# Patient Record
Sex: FEMALE | Race: BLACK OR AFRICAN AMERICAN
[De-identification: names, ages, dates, MRNs, and addresses within clinical notes are randomized per-mention and may not be internally consistent; named-entity substitution may affect disease eponyms.]

---

## 2018-09-15 ENCOUNTER — HOSPITAL ENCOUNTER (EMERGENCY)
Dept: HOSPITAL 92 - ERS | Age: 28
LOS: 1 days | Discharge: HOME | End: 2018-09-16
Payer: SELF-PAY

## 2018-09-15 DIAGNOSIS — O20.0: Primary | ICD-10-CM

## 2018-09-15 LAB
BASOPHILS # BLD AUTO: 0 THOU/UL (ref 0–0.2)
BASOPHILS NFR BLD AUTO: 0.3 % (ref 0–1)
CRYSTAL-AUWI FLAG: 0.4 (ref 0–15)
EOSINOPHIL # BLD AUTO: 0.1 THOU/UL (ref 0–0.7)
EOSINOPHIL NFR BLD AUTO: 1.5 % (ref 0–10)
HEV IGM SER QL: 4.5 (ref 0–7.99)
HGB BLD-MCNC: 12.5 G/DL (ref 12–16)
HYALINE CASTS #/AREA URNS LPF: (no result) LPF
LYMPHOCYTES # BLD: 3.4 THOU/UL (ref 1.2–3.4)
LYMPHOCYTES NFR BLD AUTO: 34.1 % (ref 21–51)
MCH RBC QN AUTO: 25.7 PG (ref 27–31)
MCV RBC AUTO: 77.1 FL (ref 78–98)
MONOCYTES # BLD AUTO: 0.7 THOU/UL (ref 0.11–0.59)
MONOCYTES NFR BLD AUTO: 7.4 % (ref 0–10)
NEUTROPHILS # BLD AUTO: 5.7 THOU/UL (ref 1.4–6.5)
NEUTROPHILS NFR BLD AUTO: 56.8 % (ref 42–75)
PATHC CAST-AUWI FLAG: 0.14 (ref 0–2.49)
PLATELET # BLD AUTO: 239 THOU/UL (ref 130–400)
RBC # BLD AUTO: 4.84 MILL/UL (ref 4.2–5.4)
RBC UR QL AUTO: (no result) HPF (ref 0–3)
SP GR UR STRIP: 1.02 (ref 1–1.04)
SPERM-AUWI FLAG: 0 (ref 0–9.9)
WBC # BLD AUTO: 10 THOU/UL (ref 4.8–10.8)
WBC UR QL AUTO: (no result) HPF (ref 0–3)
YEAST-AUWI FLAG: 0 (ref 0–25)

## 2018-09-15 PROCEDURE — 84702 CHORIONIC GONADOTROPIN TEST: CPT

## 2018-09-15 PROCEDURE — 86850 RBC ANTIBODY SCREEN: CPT

## 2018-09-15 PROCEDURE — 87591 N.GONORRHOEAE DNA AMP PROB: CPT

## 2018-09-15 PROCEDURE — 81003 URINALYSIS AUTO W/O SCOPE: CPT

## 2018-09-15 PROCEDURE — 87491 CHLMYD TRACH DNA AMP PROBE: CPT

## 2018-09-15 PROCEDURE — 76856 US EXAM PELVIC COMPLETE: CPT

## 2018-09-15 PROCEDURE — 86901 BLOOD TYPING SEROLOGIC RH(D): CPT

## 2018-09-15 PROCEDURE — 36415 COLL VENOUS BLD VENIPUNCTURE: CPT

## 2018-09-15 PROCEDURE — 81015 MICROSCOPIC EXAM OF URINE: CPT

## 2018-09-15 PROCEDURE — 87480 CANDIDA DNA DIR PROBE: CPT

## 2018-09-15 PROCEDURE — 85025 COMPLETE CBC W/AUTO DIFF WBC: CPT

## 2018-09-15 PROCEDURE — 87660 TRICHOMONAS VAGIN DIR PROBE: CPT

## 2018-09-15 PROCEDURE — 87510 GARDNER VAG DNA DIR PROBE: CPT

## 2018-09-15 PROCEDURE — 86900 BLOOD TYPING SEROLOGIC ABO: CPT

## 2018-09-16 NOTE — ULT
ULTRASOUND PELVIC TRANSVAGINAL

9/15/18

 

HISTORY: 

Evaluate for ectopic pregnancy. 

 

COMPARISON:  

None.

 

TECHNIQUE:  

Real time gray scale, color doppler and spectral analysis of the pelvis was performed transabdominal 
approach. 

 

FINDINGS:  

There is a single viable intrauterine pregnancy with average ultrasound age of 11 week, 5 days. Estim
ated date of delivery 4/1/19. Crown-rump length 4.92 cm, 11 week, 5 day. Fetal heart rate documented 
at 158 beats per minute. 

 

Small subchorionic hemorrhage. Small left ovarian cyst. Right ovary is unremarkable. 

 

IMPRESSION:  

Single viable intrauterine pregnancy.  

 

 

POS: TRISTIAN

## 2018-12-03 ENCOUNTER — HOSPITAL ENCOUNTER (OUTPATIENT)
Dept: HOSPITAL 92 - BICULT | Age: 28
Discharge: HOME | End: 2018-12-03
Attending: FAMILY MEDICINE
Payer: COMMERCIAL

## 2018-12-03 DIAGNOSIS — Z3A.23: ICD-10-CM

## 2018-12-03 DIAGNOSIS — R10.2: Primary | ICD-10-CM

## 2018-12-03 PROCEDURE — 76805 OB US >/= 14 WKS SNGL FETUS: CPT

## 2018-12-03 NOTE — ULT
ULTRASOUND OBSTETRICAL COMPLETE:

 

Date:  12/03/18 

 

HISTORY:

27-year-old female in second trimester of pregnancy with lower abdominal/pelvic pain. Evaluate fetal 
anatomy. 

 

FINDINGS:

 

Fetal number: Carrasquillo. 

Fetal lie: Cephalic. 

Maternal cervix: Closed. Not measured. Internal cervical os not visualized. 

Placenta: Posterior. Inferior edge not well visualized. 

Amniotic fluid volume: FUENTES = 16 cm. 

Fetal heart rate: 142 bpm. 

 

The following fetal anatomy is visualized, with no evidence of anomalies:

 

Head, lateral ventricles, cerebellum, nose and lips, spine, upper limbs, lower limbs, four chamber he
art, umbilical cord, cord insertion, stomach, kidneys, and bladder.

 

Fetal biometry:

 

Head circumference (HC):       20.2 cm      22w      3d

Biparietal diameter (BPD):      5.2 cm      21w      5d

Abdominal circumference (AC):      17.7 cm      22w      4d

Femur length (FL):            3.7 cm      21w      6d

 

Average ultrasound age (AUA):                  2w      1d

Estimated date of delivery (CHRISTOPHER):  04/07/2019

 

Last menstrual period (LMP):  06/25/2018. 

Gestational age by LMP: 23w 0d

 

Estimated fetal weight (EFW):  483 g +/- 71 g (1 lb 1 oz +/- 2 oz)

 

IMPRESSION:

1. Live second trimester intrauterine gestation.

2. Estimated gestational age of 23 weeks, 1 day.

3. Cephalic lie.

4. No fetal anatomical abnormalities.

5.  Poor visualization of internal cervical os and inferior edge of placenta. Recommend follow-up. 

 

renzo []

 

POS: TERRY

## 2019-02-18 NOTE — PDOC.FPROB
FMR OB H&P: HPI





- History of Present Illness


Chief Complaint: Abdominal pain and vaginal pressure


Indentification: This is a 27 yo  at 34.0 wk by LMP c/w 11.5wk US


History of Present Illness: 





This is a 27 yo  at 34.0 wk by LMP c/w 11.5wk US who present to L&D with 

a cc of abdominal pain and vaginal pressure. She states this started at around 

noon today. She reports pelvic pressure, 4 contractions since that time, and 

nausea. She reports some blurring of her vision in the last 3 weeks but 

currently denies it. She believes the blurring is 2/2 to recently starting and 

stopping her zoloft. She denies LOF, vaginal bleeding, headache, or SOB. She 

reports FM.


Primary Care Physician: 





Escobar Victoria MD 





FMR OB H&P: Current Pregnancy





- Prenatal Care


: 4


Para: 1203


Gestational age: 34.0


Due date: 19


Dating Criteria: LMP/11.5wk US





- OB Labs


Blood type: B


RH: positive


Antibody Screen: negative


HIV: negative


RPR: negative


HepBsAg: negative


Rubella: immune


Pap Smear: Negative for intraepithelial lesion


1 hour gtt: 124


A1c: 5.0


GBS: unknown





- First Trimester Ultrasound


First trimester: 





11.5 wk dating ultrasound done in the ED on 9/15/18 placing CHRISTOPHER at 19





FMR OB H&P: History





- Past Medical History


PMH: 





Major depression, maternal obesity





- OB History


OB History: 





1st pregnancy,  5 months premature, 1lb 5oz, alive and well now


2nd  Term


3rd  37wk





- GYN History


GYN History: 





none





- Surgical History


Sx History: 





Noncontributory





- Social History


Social History: 





none





- Family History


Family History: 





none





FMR OB H&P: Medications





- Current


Home Medications: 


 











 Medication  Instructions  Recorded  Confirmed  Type


 


Prenatal 21/Iron Fu/Folic Acid 1 tablet PO DAILY 13 History





[Prenatal Complete Caplet]    


 


Sertraline HCl [Zoloft] 25 mg PO DAILY 19 History











Allergies/Adverse Reactions: 


 Allergies











Allergy/AdvReac Type Severity Reaction Status Date / Time


 


No Known Allergies Allergy   Verified 19 15:26














FMR OB H&P: ROS





- Review of Systems


General: denies: fever/chills, weight/appetite/sleep changes, fatigue


Eyes: reports: vision changes.  denies: eye pain


Cardiovascular: denies: chest pain, palpitation, edema


Respiratory: denies: cough, congestion, shortness of breath


Gastrointestinal: reports: abdominal pain, nausea.  denies: vomiting, diarrhea, 

constipation


Genitourinary (Female): reports: dysuria (in the past), contractions, vaginal 

pressure.  denies: polyuria, vaginal discharge, vaginal pain, vaginal bleeding


Musculoskeletal: reports: pain (Hip pain)


Neurologic: denies: numbness, syncope, loss of counsciousness, headache


Integumentary: denies: itching, rash


Endocrine: denies: polydipsia, polyuria, polyphagia


Psychological: reports: depression.  denies: anxiety





FMR OB H&P: Vital Signs





- Maternal


Vital signs: 





Temp 98.8, /56, HR 77, RR 18





- Fetal Heart Tones


Baseline: 135 (Poor tracing due to body habitus)


[place] contractions every: None visualized, 1 felt by nursing during visit





FMR OB H&P: Physical Exam





- Physical Exam


General: NAD, awake, alert and oriented


HEENT: EOMI, MMM


Neck: FROM, no JVD


Chest: non-tender to palpation, no lesions


Heart: RRR, normal S1/S2, no murmurs/rubs/gallops, pulses present, no edema


General: CTAB, no respiratory distress, no wheezing, no retractions


Abdomen: soft, bowel sound present, other (mild lower abdominal/pelvic pain 

with palpation of abdomen)


Musculoskeletal: pulses present, FROM in all four extremities


Neurological: sensation to pain,touch and proprioception grossly normal


Skin: capillary refill <2 seconds


Lymphatic: no unusual bruising or bleeding, no purpura


Psychiatric: intact recent and remote memory, good judgement and insight





FMR OB H&P: A/P





- Problem List


(1) Third trimester pregnancy


Status: Acute   Code(s): Z34.93 - ENCNTR FOR SUPRVSN OF NORMAL PREG, UNSP, 

THIRD TRIMESTER   


Disposition: 





This is a 27 yo  at 34.0 wk by LMP c/w 11.5wk US








Abdominal pain, R/O  labor


-Monitor FHTs


-UA with Urine culture


-VP3 and GC chlamydia collected and pending


-Speculum exam shows small, thick, white discharge, cervix appears closed


-SVE shows 1/T/H


-Pt has an appointment with Dr. Victoria tomorrow


-Pain is likely 2/2 david shoemaker contractions, encourage hydration and to 

follow up tomorrow


-Will call pt with lab results and send in appropriate medications  


Discussion: 


Date/Time: 19 7112











This H&P was discussed with  [] and  [] who agree with the above 

documentation and plan.





Signature: 





Dirk Lazaro, PGY-1





Addendum - Attending





- Attending Attestation


Date/Time: 19 9304





I personally evaluated the patient and discussed the management with Dr. Lazaro


I agree with the History, Examination, Assessment and Plan documented above 

with any addition or exceptions noted below.


27 yo  at 34 by LMP/11.5w US presenting for abdominal pain, vaginal 

pressure and 4 contractions. 


No evidence of  labor. Cervix closed. Labs negative. 


Counseling provided. Strict ER precautions reviewed. Followup with Dr Victoria.

## 2019-04-02 NOTE — PDOC.EVN
Event Note





- Event Note


Event Note: 


Subjective:


This is a 27yo  who delivered a viable M via pLTCS @ 0302 on 19 due 

to NRFHT. APGARS 8,9. QBL 2385ml. Patient doing well 6hrs after section. 

Patient is complaining of 10/10 pain at the incision site. Patient has wound 

vac in place. Patient currently using PCA pump and has clicked the button 3 

times in the last hour. Patient states she has not ambulated, not passed flatus 

and has not eaten. Patient complaining of some SOB.





Objective:


PE:


General: patient resting comfortably in bed, no distress


Cardio: RRR, no murmurs, rubs or gallops


Resp: CTAB, no labored breathing


Abd: wound vac in place - clean, dry, intact, mild TTP along incision, guarding


MSK: no LE swelling





A&P:


- Continue PP care


- Continue PCA pump- advised patient to use every 10 min as instructed by 

anesthesia. Will reassess later to see if pain is controlled. 


- Encourage frequent ambulation


- Monitor VS, currently stable


- AM hemogram pending





Case discussed with Dr. Grover 








Attending Note: 


27 yo  female s/p Emergent PLTCS for fetal bradycardia 


HD#1


POD#0


Patient with increased post-op pain. Anaesthesia managing via PCA. Tolerating 

liquids. 





VS reviewed. Labs reviewed. Op-report reviewed. 


Mild distress. In pain. 


RRR. No murmurs. 


CTA bilaterally. No W/C/R


Fundus firm. Nontender. 


Wound vac in place. Tender to palpation to abdomen surrounding incision. 


No E/C/C





1. s/p PLTCS: Continue routine post-op care. Monitor VS and urine output 

closely. Continue to monitor wound vac for output. Montior post-op pain 

closely. 


2. Severe PPH due to uterine extension: Repeat H&H along with coags now. Trend. 

Denies dizziness at present. HR WNL. BP WNL. Blood and products as needed. 

Continue IVFs for now. 


3. Hx of PTL: Recommend 17-OHP with future pregnancies. Did not complete course 

this pregnancy. 


4. Hx of MDD: Continue SSRI


5. BMI 45: Lifestyle changes. Encourage breast feeding. 


6. Contraception: Unsure. Considering LARC. 





Flori

## 2019-04-02 NOTE — DN
DATE OF PROCEDURE:  2019



RESIDENT SURGEON:  Edenilson Moreno MD, PGY-2.



ATTENDING SURGEON: Dr. Lety Candelaria 



ASSISTANT SURGEON:  Dr. Alex Abdalla.



PROCEDURE PERFORMED:  Primary low-transverse  section.



PREOPERATIVE DIAGNOSES:  

1. Term intrauterine pregnancy.

2. Non-reassuring fetal heart tones.

3. Depression.

4. History of  labors.



POSTOPERATIVE DIAGNOSES:  

1. Term intrauterine pregnancy.

2. Non-reassuring fetal heart tones.

3. Depression.

4. History of  labors.

5. Postpartum hemorrhage.



ANESTHESIA:  General anesthesia.



INDICATIONS:  This patient is a 28-year-old, G4, P1-2-0-3 female at 40 and 1 
week's

gestation, who presented for induction of labor when she was begun with 
induction of

labor with Cytotec, but the infant then had non-reassuring fetal heart tones 
had a

heart rate dropped down into the 70s off and on, and at that time, we took her 
for

primary . 



PROCEDURE IN DETAIL:  After risks, benefits, and alternatives were explained to 
the

patient, she gave an informed consent.  Preoperative antibiotics included 
cefazolin

2 g IV.  The patient was taken to the operating room where general anesthesia 
was

initiated, as fetal heart tones were in the 70s when we checked them at the 
bedside.

 She was placed in supine position with left tilt, and prepped and draped in the

usual sterile fashion.  We did not have time to allow for the deep prep to 
fully dry

as it was an emergent .  A Pfannenstiel incision was made with a 
scalpel

and carried down to the level of fascia, which was sharply nicked.  Fascia was 
cut

and extended bilaterally with curved Olivas scissors.  The inferior and superior 
edges

of the cut fascial edges were elevated with Kocher clamps and the rectus muscles

were sharply and bluntly dissected free.  Recti were divided digitally, 
retracted

manually.  Peritoneum was entered bluntly and retracted manually.  Bladder 
blade was

placed.  A low transverse score was made with a scalpel and the uterus was 
entered

in the midline with a scalpel.  Clear fluid was seen and the hysterotomy was

extended manually.  Infant was noted to be vertex and was easily delivered by 
fundal

pressure.  There was a nuchal cord x1 and body cord x1 noted.  Mouth and nares 
were

bulb suctioned.  Cord clamped and cut and grossly normal male infant was handed 
to

waiting nurse.  Cord gas was obtained and then cord blood was obtained.  
Placenta

was manually extracted, found to be intact with 3-vessel cord and was sent for

pathology.  The uterus was then able to be externalized.  Due to its large size
, we

were unable to remove it and so we had to perform repair within the abdomen.  
The

endometrium was curetted with a dry lap.  During this time, it was noted that 
there

was an extension of the hysterotomy incision causing a little bit more excessive

bleeding.  Bladder blade was replaced and the uterus was closed with a running

locking 1 Monocryl suture.  During this time with the extension of the uterus, 
there

was still noted to be some bleeding.  There was concern that we had extended 
into

the artery.  At this time, we consulted Dr. Alex Abdalla, OB-GYN to assist, he 
came

in and used 1-0 chromic suture in  a horizontal mattress fashion.  We also then

used a FloSeal on the lower uterine segment as there was some bleeding still,

FloSeal was applied.  Following this, hemostasis was noted.  The abdomen was

irrigated with saline and suctioned free of clots.  The hysterotomy was again 
noted

to be hemostatic.  The peritoneum was closed with 3-0 chromic suture in a 
running

nonlocking fashion.  The fascia was then closed with a running nonlocking 0 
Vicryl

suture.  This subcutaneous tissue was irrigated and bleeders were made 
hemostatic with

Bovie cautery.  The subcutaneous layer was then approximated with 3-0 plain gut 
suture with

three interrupted sutures.  The skin was approximated with staples and a wound 
VAC

was placed.  All counts were correct.  The patient tolerated the procedure well 
and

was taken to recovery room in stable condition.  



QBL was noted to be 2385 mL. 



COMPLICATIONS:   postpartum hemorrhage and extension of the hysterotomy toward

her left side. 



FINDINGS:  Grossly normal male infant with Apgars of 9 and 9, delivered at 0302

hours, grossly normal placenta with three-vessel cord was sent to pathology. 



DRAINS:  Dillon to gravity draining clear urine.







Job ID:  [id number]



MTDD

## 2019-04-02 NOTE — PDOC.EVN
Event Note





- Event Note


Event Note: 


I was present and assisted with the emergent 1* LCT C/S for repetitive 

prolonged decelerations under general anesthesia. Viable male infant was 

delivered in vertex presentation. Nuchal and body cord noted and reduced. 

Apgars 9/9. Cord gas and cord blood collected. Uterus closed in running locked 

fashion with good hemostasis. Left angle of incision had area of significant 

bleeding prior to closure with improvement and area was oversewn with good 

hemostasis. Rest of closure if abdomen with no complications. QBL 2385 mL. 

Infant to nursery and mother to recovery in good condition. Resident- Tiffanie.

## 2019-04-02 NOTE — PDOC.FPROB
FMR OB H&P: HPI





- History of Present Illness


Chief Complaint: induction of labor


Indentification: 


History of Present Illness: 





This is a 27yo  at 40w EGA by 1TUS presenting for induction of labor. Hx 

is significant for  labor in previous pregnacies at a reported "20 weeks

" and 35weeks EGA. Pt has been non compliant with [place] injections and non 

compliant with prenatal vitamin. Pt has also been referred to [facility] for obesity 

but has been non compliant with visits. Otherwise prenatal care has been 

unremarkable with normal labwork thus far. 





Today pt denies vaginal bleeding/LOF/contractions. She reports good fetal mvmt.


Primary Care Physician: 





Dr. Escobar Gonsales





FMR OB H&P: Current Pregnancy





- Prenatal Care


: 4


Para: 1203


Gestational age: 40


Due date: 2019


Total weight gain: 21lbs





- OB Labs


Blood type: B


RH: negative


Antibody Screen: negative


HIV: negative


RPR: negative


HepBsAg: negative


Rubella: immune


Urine drug screen: negative


Gonorrhea: negative


Chlamydia: negative


1 hour gtt: 124


GBS: negative





FMR OB H&P: History





- Past Medical History


PMH: 





depression





- Surgical History


Sx History: 





none





- Social History


Social History: 





denies t/a/d





- Family History


Family History: 





none





FMR OB H&P: Medications





- Current


Home Medications: 


 











 Medication  Instructions  Recorded  Confirmed  Type


 


Sertraline HCl [Zoloft] 50 mg PO DAILY 19 History











Allergies/Adverse Reactions: 


 Allergies











Allergy/AdvReac Type Severity Reaction Status Date / Time


 


No Known Allergies Allergy   Verified 19 23:42














FMR OB H&P: ROS





- Review of Systems


General: denies: fever/chills, fatigue


Eyes: denies: eye pain, vision changes


ENT: denies: nasal congestion, rhinorrhea


Cardiovascular: denies: chest pain, palpitation


Respiratory: denies: cough, congestion, shortness of breath


Gastrointestinal: denies: abdominal pain, indigestion


Genitourinary (Female): denies: incontinence, dysuria


Musculoskeletal: denies: pain, stiffness


Neurologic: denies: numbness, syncope, seizures


Integumentary: denies: itching, rash


Endocrine: denies: cold intolerance, heat intolerance


Hematologic/Lymphatic: denies: prolonged or excessive bleeding


Psychological: reports: depression.  denies: anxiety





FMR OB H&P: Physical Exam





- Physical Exam


General: NAD, awake, alert and oriented


HEENT: EOMI, MMM, grossly normal vision, grossly normal hearing


Neck: supple, trachea midline


Chest: non-tender to palpation


Breast: symmetric


Heart: RRR, normal S1/S2


General: CTAB, no respiratory distress


Abdomen: soft, gravid


Musculoskeletal: normal gait and station, pulses present


Neurological: no tremor, no focal deficit


Skin: no rash, good tugor


Lymphatic: no unusual bruising or bleeding, no purpura


Psychiatric: intact recent and remote memory, good judgement and insight





- Pelvic Exam


SVE: /-3


Farah score: 3





FMR OB H&P: A/P





- Problem List


(1) Third trimester pregnancy


Current Visit: No   Status: Acute   Code(s): Z34.93 - ENCNTR FOR SUPRVSN OF 

NORMAL PREG, UNSP, THIRD TRIMESTER   


Discussion: 


Term IUP


A- FHT stable with good accells, no contractions on subjective or on monitor. 

Cervix unfavorable as of now. Pt has refused tdap vaccine


P- will start induction with cytotec.


- will advise pt for tdap vaccine during hospitalization


- monitor fetus on monitor


- recheck pt in 4 hours


- pt desires epidural and will now watch video








Addendum - Attending





- Attending Attestation


Date/Time: 19 0050





I personally evaluated the patient and discussed the management with Dr. Lr


I agree with the History, Examination, Assessment and Plan documented above 

with any addition or exceptions noted below - 29 yo @40 weeks admitted 

for elective induction. Current pregnancy complicated by obesity. POB Hx 

includes  delivery at 25 weeks and 36 weeks.  Afebrile VSS Category 1 

FHTs. SVE per nurse /-2/. [facility]- no ctx. A/P: 1) IUP@40 weeks- 

Reassurring FHTs; plan to place cytotec for cervical ripening.

## 2019-04-03 NOTE — PDOC.EVN
Event Note





- Event Note


Event Note: 





re-evaluated patient due to fatigue. Patient states she would now like to 

undergo a blood transfusion in addition to the iron infusion. Will type, cross, 

and transfuse 2 units PRBCs.

## 2019-04-03 NOTE — PDOC.PP
Post Partum Progress Note


Post Partum Day #: 1


Subjective: 





Pt c/o pain. No BM or flatus yet. Has ambulated to hallway. On PCA pump. 

Improving pain but still very painful.


PO intake tolerated: yes


 Vital Signs (12 hours)











  Temp Pulse Resp BP Pulse Ox


 


 19 05:00  98.2 F  92  18  131/69  99


 


 19 23:00  98.1 F  88  18  130/65  100


 


 19 20:25      99


 


 19 19:46  97.9 F  80  16  99/55 L  99








 Weight











Weight                         117.934 kg

















- Physical Examination


General: NAD


Cardiovascular: no m/r/g, RRR


Respiratory: clear to auscultation bilaterally, non-labored breathing


Abdominal: + bowel sounds


Extremities: negative homans (B)


Skin: CS incision dry & intact, no rash


Neurological: no gross focal deficits


Psychiatric: A&Ox3, normal affect


Result Diagrams: 


 19 07:56





Additional Labs: 


 Post Partum Labs











Blood Type  B POSITIVE   19  00:56    


 


Hep Bs Antigen  Non-Reactive S/CO (NonReactive)   19  00:56    











(1) Postpartum care and examination


Code(s): Z39.2 - ENCOUNTER FOR ROUTINE POSTPARTUM FOLLOW-UP   Status: Acute   





(2) Previous  section


Code(s): Z98.891 - HISTORY OF UTERINE SCAR FROM PREVIOUS SURGERY   Status: 

Acute   





- Assessment/Plan





29 y/o F PPD1 s/p Primary LTCS for NRFHT. 





1. Postpartum care - Keep incision wound dry and clean. Bandage has been 

removed. Continue pain control. advance diet and activity level. 


2. Anemia: ~2300 EBL. Continue Iron supplementation BID, asymptomatic. Hct 

dropped to 8.7.


3. Elevated BMI: Recommend weight loss. High risk of wound complication. 


4. Contraception: Pt would like to be permanently sterilized. Will begin 

process. 





Addendum - Attending





- Attending Attestation


Date/Time: 19 7121





I personally evaluated the patient and discussed the management with Dr. Benitez, Nemo, and Javier


I agree with the History, Examination, Assessment and Plan documented above 

with any addition or exceptions noted below.





27 yo  female s/p Emergent PLTCS for fetal bradycardia 


HD#2


POD#1


Patient did well overnight. Reports dizziness with standing. Unable to stand 

without assistance. Lochia with occasional clots the size of grapes. Pain 

improved with PCA.  





VS reviewed. Labs reviewed  -- labs ordered yesterday afternoon not done. 


NAD. Sitting up in bed. 


RRR. No murmurs. 


CTA bilaterally. No W/C/R


Fundus firm. Nontender. 


Wound vac in place. Tender to palpation to abdomen surrounding incision. 


No E/C/C





1. s/p PLTCS: Continue routine post-op care. Continue to monitor wound vac for 

output -- none in collecting system. Montior post-op pain closely. Will 

transition to oral meds today. 


2. Severe PPH due to uterine extension: cEBL = 2876 mL. Symptomatic today. 

Encouraged blood transfusion. Patient would like iron infusion first. Will 

follow up this afternoon on symptoms. 


3. Hx of PTL: Recommend 17-OHP with future pregnancies. Did not complete course 

this pregnancy. 


4. Hx of MDD: Continue SSRI


5. BMI 45: Lifestyle changes. Encourage breast feeding. 


6. Contraception: Considering LARC vs Permanent. 





Flori

## 2019-04-04 NOTE — PDOC.PP
Post Partum Progress Note


Post Partum Day #: 2


Subjective: 





Pt reports improved pain. Off PCA Pump and now on PO medications. Passing flatus

, no BM. Fatigue present, but also improving. Minimal lochia with small clots. 

Tolerating PO well. Hydrating well. Afebrile and BPs WNL. 


 Vital Signs (12 hours)











  Temp Pulse Pulse Resp BP BP Pulse Ox


 


 19 03:47  98.5 F  81   18   122/72  99


 


 19 00:48  98.9 F   101 H  18  132/73   99


 


 19 21:43  98.3 F   95  18  113/55 L   100


 


 19 21:22  98.3 F   98  18  126/58 L   99


 


 19 20:34  98.6 F   104 H  18  127/64   99


 


 19 19:42  98.3 F  100   18   108/51 L  100








 Weight











Weight                         117.934 kg











 Most Recent Monitor Data











NIBP                           124/56

















- Physical Examination


General: NAD


Cardiovascular: no m/r/g, RRR


Respiratory: clear to auscultation bilaterally, non-labored breathing


Abdominal: + bowel sounds, lochia (mild), no distention, appropriately TTP


Fundus firm & at: 4cm, but is obese which limits. incision site with wound vac 

present


Extremities: negative homans (B)


Skin: CS incision dry & intact (wound vac present.), no rash (no erythea)


Neurological: no gross focal deficits


Psychiatric: A&Ox3, normal affect


Result Diagrams: 


 19 08:13





Additional Labs: 


 Post Partum Labs











Blood Type  B POSITIVE   19  00:56    


 


Hep Bs Antigen  Non-Reactive S/CO (NonReactive)   19  00:56    











(1) Postpartum care and examination


Code(s): Z39.2 - ENCOUNTER FOR ROUTINE POSTPARTUM FOLLOW-UP   Status: Acute   





(2) Previous  section


Code(s): Z98.891 - HISTORY OF UTERINE SCAR FROM PREVIOUS SURGERY   Status: 

Acute   





- Assessment/Plan





29 y/o F PPD#2 s/p Primary LTCS for NRFHT. 





1. Postpartum care - Wound vac in place with minimal output. Keep incision 

wound dry and clean. Continue pain control. Continue to advance diet and 

activity level. BP's WNL and afebrile.


2. Acute blood loss Anemia: ~2300 EBL. S/p 2u PRBCs on 4/3, repeat CBC this AM 

pending. Continue Iron supplementation BID. Sx have improved.


3. Elevated BMI: Recommend weight loss. High risk of wound complication. Wound 

vac in place.


4. Contraception: Pt would like to be permanently sterilized. Will begin 

process. LARC also an option. 





DISPO: Consider discharge in 1-2days.








Addendum - Attending





- Attending Attestation


Date/Time: 19 1400





I personally evaluated the patient and discussed the management with Dr. Benitez, Nemo, and Javier


I agree with the History, Examination, Assessment and Plan documented above 

with any addition or exceptions noted below.





27 yo  female s/p Emergent PLTCS for fetal bradycardia 


HD#3


POD#2


No acute changes overnight. Pain controlled on PO meds. Lochia improving. 

Voiding well. No longer having dizziness upon standing. Fatigue improving. 

Declines breast feeding.  





VS reviewed. Labs reviewed.


NAD. Sitting up in bed. 


RRR. No murmurs. 


CTA bilaterally. No W/C/R


Fundus firm. Nontender. 


Wound vac in place. Less tender to palpation today. 


No E/C/C





1. s/p PLTCS: Continue routine post-op care. Continue to monitor wound vac for 

output -- none in collecting system. Continue oral pain meds. Add NSAIDs as 

scheduled. 


2. Severe PPH due to uterine extension: cEBL = 2876 mL. s/p iron infusion. s/p 

2 units pRBC. H&H response appropriate. 


3. Hx of PTL: Recommend 17-OHP with future pregnancies. Did not complete course 

this pregnancy. 


4. Hx of MDD: Continue SSRI. Monitor closely for PPD. Affect appropriate. 


5. BMI 45: Lifestyle changes. Encourage breast feeding but patient declines 

again.  


6. Contraception: Considering LARC vs Permanent. 





Encourage ambulation. Patient still not getting up much reportedly due to pain. 

Continue to monitor closely. Likely home tomorrow. 





ABrayMD

## 2019-04-05 NOTE — PDOC.PP
Post Partum Progress Note


Post Partum Day #: 3


Subjective: 





Pt doing well. Passed BM. Ambulating. Pain controlled on PO medications. Denies 

fever or additoinal complaints. 


Ambulation: yes


 Vital Signs (12 hours)











  Temp Pulse Resp BP Pulse Ox


 


 19 08:05  98.3 F  79  20  133/69  98








 Weight











Weight                         117.934 kg











 Most Recent Monitor Data











NIBP                           124/56

















- Physical Examination


General: NAD


Cardiovascular: no m/r/g, RRR


Respiratory: clear to auscultation bilaterally, non-labored breathing


Abdominal: + bowel sounds, no distention (wound vac present overlying)


Extremities: negative homans (B)


Skin: no rash


Psychiatric: A&Ox3, normal affect


Result Diagrams: 


 19 08:13





Additional Labs: 


 Post Partum Labs











Blood Type  B POSITIVE   19  00:56    


 


Hep Bs Antigen  Non-Reactive S/CO (NonReactive)   19  00:56    











(1) Postpartum care and examination


Code(s): Z39.2 - ENCOUNTER FOR ROUTINE POSTPARTUM FOLLOW-UP   Status: Acute   





(2) Previous  section


Code(s): Z98.891 - HISTORY OF UTERINE SCAR FROM PREVIOUS SURGERY   Status: 

Acute   





- Assessment/Plan





29 y/o F PPD#3 s/p Primary LTCS for NRFHT. 





1. Postpartum care - Wound vac in place with minimal output. Continue 

outpatient. Keep incision wound dry and clean. Continue pain control with 

NSAIDS and Norco PRN. Continue to advance diet and activity level. BP's WNL and 

afebrile.


2. Acute blood loss Anemia: ~2800 calculated blood loss. S/p 2u PRBCs on 4/3, 

repeat CBC improved. Continue Iron supplementation BID. Sx have resolved.


3. Elevated BMI: Recommend weight loss. High risk of wound complication. Wound 

vac in place.


4. Contraception: Pt would like to be permanently sterilized. Will begin 

process. LARC also an option.


5. Major Depression: Continue SSRI. F/u outpatient and monitor signs and 

symptoms. 





DISPO:OK for discharge today





Addendum - Attending





- Attending Attestation


Date/Time: 19 7532





I personally evaluated the patient and discussed the management with Dr. Benitez, Nemo, and Javier


I agree with the History, Examination, Assessment and Plan documented above 

with any addition or exceptions noted below.





29 yo  female s/p Emergent PLTCS for fetal bradycardia 


HD#4


POD#3


No acute changes overnight. Pain controlled on PO meds. Lochia improving. 

Voiding well. Bottle feeding only. 





VS reviewed. Labs reviewed.


NAD. Sitting up in bed. 


RRR. No murmurs. 


CTA bilaterally. No W/C/R


Fundus firm. Nontender. 


Wound vac in place. Less tender to palpation today. 


No E/C/C





1. s/p PLTCS: Meeting milestones. Ok to d/c to home. Follow up either Tuesday 

to Thursday next week for removal of wound vac and staples. 


2. Severe PPH due to uterine extension: cEBL = 2876 mL. s/p iron infusion. s/p 

2 units pRBC. H&H response appropriate. 


3. Hx of PTL: Recommend 17-OHP with future pregnancies. Did not complete course 

this pregnancy. 


4. Hx of MDD: Continue SSRI. Monitor closely for PPD. Affect appropriate. 


5. BMI 45: Lifestyle changes. Encourage breast feeding but patient declines 

again.  


6. Contraception: Considering LARC vs Permanent. 





Ok to d/c to home. 





Flori

## 2019-04-06 NOTE — ULT
FUltrasound Doppler duplex venous bilateral lower extremities:

3/27/2019



HISTORY:

Bilateral lower extremity edema .



TECHNIQUE:

Grayscale, color-flow, and spectral analysis, of major veins of bilateral lower extremities.



FINDINGS:

There is demonstration of blood flow with normal compressibility, of the bilateral common femoral, pr
ofunda femoral, greater saphenous, femoral, popliteal, and posterior tibial, veins.



IMPRESSION:

Negative. No deep venous thrombosis of bilateral lower extremities.



Reported By: Jose Martin Luna 

Electronically Signed:  4/6/2019 10:24 AM

## 2020-02-15 NOTE — RAD
LEFT THUMB THREE VIEWS:

 

HISTORY:

Smashed thumb in door with pain.

 

COMPARISON:

None.

 

FINDINGS:

Three views of the left thumb show no evidence of acute fracture or dislocation. No degenerative hardy
ges are seen.

 

IMPRESSION:

Unremarkable examination.

 

POS: C

## 2022-12-25 ENCOUNTER — HOSPITAL ENCOUNTER (EMERGENCY)
Dept: HOSPITAL 92 - CSHERS | Age: 32
Discharge: HOME | End: 2022-12-25
Payer: COMMERCIAL

## 2022-12-25 DIAGNOSIS — R10.13: Primary | ICD-10-CM

## 2022-12-25 LAB
ALBUMIN SERPL BCG-MCNC: 4.1 G/DL (ref 3.5–5)
ALP SERPL-CCNC: 57 U/L (ref 40–110)
ALT SERPL W P-5'-P-CCNC: 17 U/L (ref 8–55)
ANION GAP SERPL CALC-SCNC: 13 MMOL/L (ref 10–20)
AST SERPL-CCNC: 19 U/L (ref 5–34)
BACTERIA UR QL AUTO: (no result) HPF
BASOPHILS # BLD AUTO: 0 10X3/UL (ref 0–0.2)
BASOPHILS NFR BLD AUTO: 0.5 % (ref 0–2)
BILIRUB SERPL-MCNC: 0.6 MG/DL (ref 0.2–1.2)
BUN SERPL-MCNC: 10 MG/DL (ref 7–18.7)
CALCIUM SERPL-MCNC: 9 MG/DL (ref 7.8–10.44)
CHLORIDE SERPL-SCNC: 104 MMOL/L (ref 98–107)
CO2 SERPL-SCNC: 23 MMOL/L (ref 22–29)
CREAT CL PREDICTED SERPL C-G-VRATE: 0 ML/MIN (ref 70–130)
EOSINOPHIL # BLD AUTO: 0.1 10X3/UL (ref 0–0.5)
EOSINOPHIL NFR BLD AUTO: 1.2 % (ref 0–6)
GLOBULIN SER CALC-MCNC: 3.5 G/DL (ref 2.4–3.5)
GLUCOSE SERPL-MCNC: 101 MG/DL (ref 70–105)
HGB BLD-MCNC: 13.3 G/DL (ref 12–15.5)
LIPASE SERPL-CCNC: 14 U/L (ref 8–78)
LYMPHOCYTES NFR BLD AUTO: 32.4 % (ref 18–47)
MCH RBC QN AUTO: 25.4 PG (ref 27–33)
MCV RBC AUTO: 74.4 FL (ref 81.6–98.3)
MONOCYTES # BLD AUTO: 0.6 10X3/UL (ref 0–1.1)
MONOCYTES NFR BLD AUTO: 7.5 % (ref 0–10)
NEUTROPHILS # BLD AUTO: 4.5 10X3/UL (ref 1.5–8.4)
NEUTROPHILS NFR BLD AUTO: 58.3 % (ref 40–75)
PLATELET # BLD AUTO: 282 10X3/UL (ref 150–450)
POTASSIUM SERPL-SCNC: 3.8 MMOL/L (ref 3.5–5.1)
PREGU CONTROL BACKGROUND?: (no result)
PREGU CONTROL BAR APPEAR?: YES
RBC # BLD AUTO: 5.24 10X6/UL (ref 3.9–5.03)
RBC UR QL AUTO: (no result) HPF (ref 0–3)
SODIUM SERPL-SCNC: 136 MMOL/L (ref 136–145)
SP GR UR STRIP: 1.01 (ref 1–1.03)
WBC # BLD AUTO: 7.6 10X3/UL (ref 3.5–10.5)
WBC UR QL AUTO: (no result) HPF (ref 0–3)

## 2022-12-25 PROCEDURE — 81015 MICROSCOPIC EXAM OF URINE: CPT

## 2022-12-25 PROCEDURE — 99284 EMERGENCY DEPT VISIT MOD MDM: CPT

## 2022-12-25 PROCEDURE — 36415 COLL VENOUS BLD VENIPUNCTURE: CPT

## 2022-12-25 PROCEDURE — 81003 URINALYSIS AUTO W/O SCOPE: CPT

## 2022-12-25 PROCEDURE — 85025 COMPLETE CBC W/AUTO DIFF WBC: CPT

## 2022-12-25 PROCEDURE — 83690 ASSAY OF LIPASE: CPT

## 2022-12-25 PROCEDURE — 80053 COMPREHEN METABOLIC PANEL: CPT

## 2022-12-25 PROCEDURE — 81025 URINE PREGNANCY TEST: CPT
